# Patient Record
Sex: MALE | Race: WHITE | NOT HISPANIC OR LATINO | Employment: OTHER | URBAN - METROPOLITAN AREA
[De-identification: names, ages, dates, MRNs, and addresses within clinical notes are randomized per-mention and may not be internally consistent; named-entity substitution may affect disease eponyms.]

---

## 2017-12-28 ENCOUNTER — OFFICE VISIT (OUTPATIENT)
Dept: URGENT CARE | Facility: MEDICAL CENTER | Age: 67
End: 2017-12-28
Payer: COMMERCIAL

## 2017-12-28 PROCEDURE — G0382 LEV 3 HOSP TYPE B ED VISIT: HCPCS

## 2017-12-28 PROCEDURE — 99283 EMERGENCY DEPT VISIT LOW MDM: CPT

## 2017-12-29 NOTE — PROGRESS NOTES
Assessment   1  Cough (786 2) (R05)   2  Sore throat (462) (J02 9)    Plan   Sore throat    · Azithromycin 250 MG Oral Tablet; TAKE 2 TABLETS ON DAY 1 THEN TAKE 1    TABLET A DAY FOR 4 DAYS    Discussion/Summary   Discussion Summary:    Advised today given antibiotic as per instructions  May use Tylenol/Motrin as needed for pain and discomfort along with warm saltwater gargles  If symptoms are not improving or worse, follow up with PCP for evaluation in 5-7 days  Medication Side Effects Reviewed: Possible side effects of new medications were reviewed with the patient/guardian today  Understands and agrees with treatment plan: The treatment plan was reviewed with the patient/guardian  The patient/guardian understands and agrees with the treatment plan      Chief Complaint   1  Cold Symptoms  Chief Complaint Free Text Note Form: Started yesterday with chest congestion dry cough sore throat taking OTC medications  History of Present Illness   Hospital Based Practices Required Assessment:      Pain Assessment      the patient states they do not have pain  (on a scale of 0 to 10, the patient rates the pain at 0 )      Abuse And Domestic Violence Screen       Yes, the patient is safe at home  -- The patient states no one is hurting them  Depression And Suicide Screen  No, the patient has not had thoughts of hurting themself  No, the patient has not felt depressed in the past 7 days  Prefered Language is  [place]  Primary Language is  English  Cold Symptoms: Sonja Amado presents with complaints of cold symptoms  Associated symptoms include nasal congestion,-- runny nose,-- post nasal drainage,-- sore throat,-- dry cough,-- productive cough,-- facial pressure-- and-- headache, but-- no ear pain,-- no shortness of breath,-- no fatigue,-- no weakness,-- no nausea,-- no vomiting,-- no diarrhea-- and-- no fever  Review of Systems   Focused-Male:      Constitutional: as noted in HPI  Gastrointestinal: no complaints of abdominal pain, no constipation, no nausea or vomiting, no diarrhea or bloody stools  Active Problems   1  Blood pressure elevated (401 9) (I10)   2  Cellulitis (682 9) (L03 90)   3  Fever (780 60) (R50 9)   4  Headache (784 0) (R51)   5  HTN (hypertension) (401 9) (I10)   6  Neck pain (723 1) (M54 2)   7  Sinus tachycardia (427 89) (R00 0)    Past Medical History   1  History of Lower extremity pain (729 5) (M79 606)   2  History of Shoulder pain (719 41) (M25 519)   3  History of Shoulder strain (840 9) (D75 185Z)    Family History   Mother    1  No pertinent family history  Father    2  No pertinent family history    Social History    · Never a smoker   · No alcohol use   · No drug use    Current Meds    1  AmLODIPine Besylate TABS; Therapy: (Recorded:[id number]) to Recorded   2  Aspirin Low Dose 81 MG TABS; Therapy: (Recorded:[id number]) to Recorded   3  Lisinopril TABS; Therapy: (Recorded:[id number]) to Recorded    Allergies   1  No Known Drug Allergies    Vitals   Signs   Recorded: [id number] 02:39PM   Temperature: 98 8 F  Heart Rate: 117  Respiration: 18  Systolic: 385  Diastolic: 84  O2 Saturation: 97    Physical Exam        Constitutional      General appearance: No acute distress, well appearing and well nourished  Ears, Nose, Mouth, and Throat      External inspection of ears and nose: Normal        Nasal mucosa, septum, and turbinates: Normal without edema or erythema  Oropharynx: Abnormal   There was 1+ enlargement, erythema and swelling of both tonsils exudate  Pulmonary      Respiratory effort: No increased work of breathing or signs of respiratory distress  Auscultation of lungs: Clear to auscultation  Cardiovascular      Auscultation of heart: Normal rate and rhythm, normal S1 and S2, without murmurs  Lymphatic      Palpation of lymph nodes in neck: No lymphadenopathy         Signatures    Electronically signed by CHARLA Gruber ; Dec 28 2017  6:22PM EST                       (Author)

## 2018-01-23 VITALS
HEART RATE: 117 BPM | SYSTOLIC BLOOD PRESSURE: 160 MMHG | DIASTOLIC BLOOD PRESSURE: 84 MMHG | OXYGEN SATURATION: 97 % | TEMPERATURE: 98.8 F | RESPIRATION RATE: 18 BRPM

## 2018-11-19 ENCOUNTER — OFFICE VISIT (OUTPATIENT)
Dept: URGENT CARE | Facility: MEDICAL CENTER | Age: 68
End: 2018-11-19
Payer: MEDICARE

## 2018-11-19 VITALS
RESPIRATION RATE: 16 BRPM | OXYGEN SATURATION: 98 % | SYSTOLIC BLOOD PRESSURE: 128 MMHG | WEIGHT: 175 LBS | HEART RATE: 78 BPM | TEMPERATURE: 98.2 F | BODY MASS INDEX: 25.92 KG/M2 | DIASTOLIC BLOOD PRESSURE: 80 MMHG | HEIGHT: 69 IN

## 2018-11-19 DIAGNOSIS — B02.9 ZOSTER WITHOUT COMPLICATIONS: Primary | ICD-10-CM

## 2018-11-19 PROCEDURE — 99212 OFFICE O/P EST SF 10 MIN: CPT | Performed by: PHYSICIAN ASSISTANT

## 2018-11-19 PROCEDURE — G0463 HOSPITAL OUTPT CLINIC VISIT: HCPCS | Performed by: PHYSICIAN ASSISTANT

## 2018-11-19 RX ORDER — PREDNISONE 10 MG/1
TABLET ORAL
Qty: 18 TABLET | Refills: 0 | Status: SHIPPED | OUTPATIENT
Start: 2018-11-19

## 2018-11-19 RX ORDER — FAMCICLOVIR 500 MG/1
500 TABLET, FILM COATED ORAL 3 TIMES DAILY
Qty: 21 TABLET | Refills: 0 | Status: SHIPPED | OUTPATIENT
Start: 2018-11-19 | End: 2018-11-26

## 2018-11-19 RX ORDER — LISINOPRIL 20 MG/1
25 TABLET ORAL DAILY
COMMUNITY

## 2018-11-19 RX ORDER — AMLODIPINE BESYLATE 5 MG/1
5 TABLET ORAL DAILY
COMMUNITY

## 2018-11-19 NOTE — PATIENT INSTRUCTIONS
Herpes Zoster   WHAT YOU SHOULD KNOW:   Herpes zoster (HZ) is also called shingles  It is an infection caused by the same virus that causes chickenpox (varicella-zoster virus)  After you get chickenpox, the virus stays in your body for several years without causing any symptoms  HZ occurs when the virus becomes active again  Once active, the virus will travel along a nerve to your skin and cause a rash  CARE AGREEMENT:   You have the right to help plan your care  Learn about your health condition and how it may be treated  Discuss treatment options with your caregivers to decide what care you want to receive  You always have the right to refuse treatment  RISKS:   If left untreated, HZ may cause eye problems, such as a drooping eyelid or blindness  It may lead to a brain infection or stroke  HZ can also cause nerve damage and lead to twitching, dizziness, or loss of taste and hearing  The blisters may leave scars or changes in skin color  HZ may cause pain even after the rash is gone  It may also lead to trouble moving parts of your body  WHILE YOU ARE HERE:   Informed consent  is a legal document that explains the tests, treatments, or procedures that you may need  Informed consent means you understand what will be done and can make decisions about what you want  You give your permission when you sign the consent form  You can have someone sign this form for you if you are not able to sign it  You have the right to understand your medical care in words you know  Before you sign the consent form, understand the risks and benefits of what will be done  Make sure all your questions are answered  Medicines:   · Antiviral medicine: These help decrease symptoms and healing time  They may also decrease your risk of developing nerve pain  You will need to start taking them within 3 days of the start of symptoms to prevent nerve pain  · Pain medicine:   You may need NSAIDs, acetaminophen, or opioid medicine depending on how much pain you are in  Do not wait until the pain is severe before you ask for more pain medicine  · Topical anesthetics: These are used to numb the skin and decrease pain  They can be a cream, gel, spray, or patch  · Anticonvulsants: These decrease nerve pain and may help you sleep at night  · Antidepressants: These may also be used to decrease nerve pain  · Epidural medicine: This is put into your spine to block pain  This medicine treats severe pain that does not get better with other pain medicines  Epidural medicine includes numbing medicine and steroids  Tests:  Your caregiver may send skin scrapings or fluid from your blisters for tests to see if you have HZ  © 2014 [address] [employer] is for End User's use only and may not be sold, redistributed or otherwise used for commercial purposes  All illustrations and images included in CareNotes® are the copyrighted property of [employer] , [employer]  or [employer]vy [employer]ed  The above information is an  only  It is not intended as medical advice for individual conditions or treatments  Talk to your doctor, nurse or pharmacist before following any medical regimen to see if it is safe and effective for you

## 2018-11-19 NOTE — PROGRESS NOTES
[facility]        NAME: Nya Kline is a 79 y o  male  : 1950    MRN: [id number]  DATE: 2018  TIME: 1:29 PM    Assessment and Plan   Zoster without complications [L94 5]  1  Zoster without complications  famciclovir (FAMVIR) 500 mg tablet    predniSONE 10 mg tablet         Patient Instructions       Follow up with PCP in 3-5 days  Proceed to  ER if symptoms worsen  Chief Complaint     Chief Complaint   Patient presents with    Rash     started with rash on chest yesterday and now on back         History of Present Illness       PT had a rash that started yesterday  Rash   This is a new problem  The current episode started yesterday  The problem has been gradually worsening since onset  The affected locations include the right shoulder  The rash is characterized by blistering and redness  He was exposed to nothing  Pertinent negatives include no anorexia, congestion, cough, diarrhea, eye pain, facial edema, fatigue, fever, joint pain, nail changes, rhinorrhea, shortness of breath, sore throat or vomiting  Past treatments include nothing  Review of Systems   Review of Systems   Constitutional: Negative for fatigue and fever  HENT: Negative for congestion, rhinorrhea and sore throat  Eyes: Negative for pain  Respiratory: Negative for cough and shortness of breath  Gastrointestinal: Negative for anorexia, diarrhea and vomiting  Musculoskeletal: Negative for joint pain  Skin: Positive for rash  Negative for nail changes  All other systems reviewed and are negative          Current Medications       Current Outpatient Prescriptions:     amLODIPine (NORVASC) 5 mg tablet, Take 5 mg by mouth daily, Disp: , Rfl:     lisinopril (ZESTRIL) 20 mg tablet, Take 25 mg by mouth daily, Disp: , Rfl:     famciclovir (FAMVIR) 500 mg tablet, Take 1 tablet (500 mg total) by mouth 3 (three) times a day for 7 days, Disp: 21 tablet, Rfl: 0    predniSONE 10 mg tablet, 4 x 3 days, 3 x 1, 2 x 1, 1 x 1, Disp: 18 tablet, Rfl: 0    Current Allergies     Allergies as of 11/19/2018    (No Known Allergies)            The following portions of the patient's history were reviewed and updated as appropriate: allergies, current medications, past family history, past medical history, past social history, past surgical history and problem list      Past Medical History:   Diagnosis Date    Hypertension        Past Surgical History:   Procedure Laterality Date    APPENDECTOMY         No family history on file  Medications have been verified  Objective   /80   Pulse 78   Temp 98 2 °F (36 8 °C) (Tympanic)   Resp 16   Ht 5' 9" (1 753 m)   Wt 79 4 kg (175 lb)   SpO2 98%   BMI 25 84 kg/m²        Physical Exam     Physical Exam   Constitutional: He appears well-developed and well-nourished  Cardiovascular: Normal rate, regular rhythm and normal heart sounds  Pulmonary/Chest: Effort normal and breath sounds normal    Nursing note and vitals reviewed            Deramatomal-vesicular rash on the RT T8

## 2018-12-01 ENCOUNTER — OFFICE VISIT (OUTPATIENT)
Dept: URGENT CARE | Facility: MEDICAL CENTER | Age: 68
End: 2018-12-01
Payer: MEDICARE

## 2018-12-01 VITALS
HEIGHT: 69 IN | TEMPERATURE: 98.7 F | DIASTOLIC BLOOD PRESSURE: 77 MMHG | SYSTOLIC BLOOD PRESSURE: 136 MMHG | RESPIRATION RATE: 16 BRPM | BODY MASS INDEX: 25.92 KG/M2 | OXYGEN SATURATION: 97 % | WEIGHT: 175 LBS | HEART RATE: 112 BPM

## 2018-12-01 DIAGNOSIS — B02.9 HERPES ZOSTER WITHOUT COMPLICATION: Primary | ICD-10-CM

## 2018-12-01 PROCEDURE — G0463 HOSPITAL OUTPT CLINIC VISIT: HCPCS | Performed by: PHYSICIAN ASSISTANT

## 2018-12-01 PROCEDURE — 99212 OFFICE O/P EST SF 10 MIN: CPT | Performed by: PHYSICIAN ASSISTANT

## 2018-12-01 RX ORDER — GABAPENTIN 300 MG/1
300 CAPSULE ORAL
Qty: 30 CAPSULE | Refills: 0 | Status: SHIPPED | OUTPATIENT
Start: 2018-12-01

## 2018-12-01 RX ORDER — ASPIRIN 81 MG/1
81 TABLET, CHEWABLE ORAL
COMMUNITY
Start: 2014-02-10

## 2018-12-01 RX ORDER — IBUPROFEN 600 MG/1
600 TABLET ORAL EVERY 6 HOURS PRN
COMMUNITY

## 2018-12-01 RX ORDER — NAPROXEN 500 MG/1
250 TABLET ORAL AS NEEDED
COMMUNITY

## 2018-12-01 NOTE — PATIENT INSTRUCTIONS
Herpes Zoster   WHAT YOU SHOULD KNOW:   Herpes zoster (HZ) is also called shingles  It is an infection caused by the same virus that causes chickenpox (varicella-zoster virus)  After you get chickenpox, the virus stays in your body for several years without causing any symptoms  HZ occurs when the virus becomes active again  Once active, the virus will travel along a nerve to your skin and cause a rash  CARE AGREEMENT:   You have the right to help plan your care  Learn about your health condition and how it may be treated  Discuss treatment options with your caregivers to decide what care you want to receive  You always have the right to refuse treatment  RISKS:   If left untreated, HZ may cause eye problems, such as a drooping eyelid or blindness  It may lead to a brain infection or stroke  HZ can also cause nerve damage and lead to twitching, dizziness, or loss of taste and hearing  The blisters may leave scars or changes in skin color  HZ may cause pain even after the rash is gone  It may also lead to trouble moving parts of your body  WHILE YOU ARE HERE:   Informed consent  is a legal document that explains the tests, treatments, or procedures that you may need  Informed consent means you understand what will be done and can make decisions about what you want  You give your permission when you sign the consent form  You can have someone sign this form for you if you are not able to sign it  You have the right to understand your medical care in words you know  Before you sign the consent form, understand the risks and benefits of what will be done  Make sure all your questions are answered  Medicines:   · Antiviral medicine: These help decrease symptoms and healing time  They may also decrease your risk of developing nerve pain  You will need to start taking them within 3 days of the start of symptoms to prevent nerve pain  · Pain medicine:   You may need NSAIDs, acetaminophen, or opioid medicine depending on how much pain you are in  Do not wait until the pain is severe before you ask for more pain medicine  · Topical anesthetics: These are used to numb the skin and decrease pain  They can be a cream, gel, spray, or patch  · Anticonvulsants: These decrease nerve pain and may help you sleep at night  · Antidepressants: These may also be used to decrease nerve pain  · Epidural medicine: This is put into your spine to block pain  This medicine treats severe pain that does not get better with other pain medicines  Epidural medicine includes numbing medicine and steroids  Tests:  Your caregiver may send skin scrapings or fluid from your blisters for tests to see if you have HZ  © 2014 [address] [employer] is for End User's use only and may not be sold, redistributed or otherwise used for commercial purposes  All illustrations and images included in CareNotes® are the copyrighted property of [employer] , [employer]  or [employer]vy [employer]ed  The above information is an  only  It is not intended as medical advice for individual conditions or treatments  Talk to your doctor, nurse or pharmacist before following any medical regimen to see if it is safe and effective for you

## 2018-12-01 NOTE — PROGRESS NOTES
[facility]        NAME: Kendra Gómez is a 79 y o  male  : 1950    MRN: [id number]  DATE: 2018  TIME: 2:37 PM    Assessment and Plan   Herpes zoster without complication [S96 8]  1  Herpes zoster without complication  gabapentin (NEURONTIN) 300 mg capsule    Discussed that the areas are healing but he needs to follow through since the pain continues  Patient Instructions     Vicodin / p o  Q 6h p r n  12    Follow up with PCP in 3-5 days  Proceed to  ER if symptoms worsen  Chief Complaint     Chief Complaint   Patient presents with    Rash     Patient relates seen here on 18 for rash localized to right chest and right upper back  Told it was Shingles and prescribed Prednisone and Famvir which he took as directed  Rash is not improving and it is painful  Denies fever  History of Present Illness       Patient had been seen and diagnosed with herpes zoster in finished prednisone and Famvir  His pain continues and is around an 8/10 and he has been using the Vicodin only at night  Is wondering if there is anything else he can use for pain and  He has not been able again to his family doctor as yet  Rash   This is a new problem  The current episode started 1 to 4 weeks ago  The problem has been gradually improving since onset  The affected locations include the chest  The rash is characterized by blistering and redness  He was exposed to nothing  Review of Systems   Review of Systems   Skin: Positive for rash  All other systems reviewed and are negative          Current Medications       Current Outpatient Prescriptions:     amLODIPine (NORVASC) 5 mg tablet, Take 5 mg by mouth daily, Disp: , Rfl:     aspirin 81 mg chewable tablet, Chew 81 mg, Disp: , Rfl:     ibuprofen (MOTRIN) 600 mg tablet, Take 600 mg by mouth every 6 (six) hours as needed, Disp: , Rfl:     lisinopril (ZESTRIL) 20 mg tablet, Take 25 mg by mouth daily, Disp: , Rfl:    naproxen (NAPROSYN) 500 mg tablet, Take 250 mg by mouth as needed for mild pain, Disp: , Rfl:     predniSONE 10 mg tablet, 4 x 3 days, 3 x 1, 2 x 1, 1 x 1, Disp: 18 tablet, Rfl: 0    famciclovir (FAMVIR) 500 mg tablet, Take 1 tablet (500 mg total) by mouth 3 (three) times a day for 7 days, Disp: 21 tablet, Rfl: 0    gabapentin (NEURONTIN) 300 mg capsule, Take 1 capsule (300 mg total) by mouth daily at bedtime, Disp: 30 capsule, Rfl: 0    Current Allergies     Allergies as of 12/01/2018    (No Known Allergies)            The following portions of the patient's history were reviewed and updated as appropriate: allergies, current medications, past family history, past medical history, past social history, past surgical history and problem list      Past Medical History:   Diagnosis Date    Hypertension        Past Surgical History:   Procedure Laterality Date    APPENDECTOMY         No family history on file  Medications have been verified  Objective   /77   Pulse (!) 112   Temp 98 7 °F (37 1 °C) (Tympanic)   Resp 16   Ht 5' 9" (1 753 m)   Wt 79 4 kg (175 lb)   SpO2 97%   BMI 25 84 kg/m²        Physical Exam     Physical Exam   Constitutional: He appears well-developed and well-nourished  Cardiovascular: Normal rate and regular rhythm  Pulmonary/Chest: Effort normal and breath sounds normal    Nursing note and vitals reviewed  Right chest anterior posterior T8 area erythematous rash with healing areas that were vesicular